# Patient Record
Sex: MALE | Race: WHITE
[De-identification: names, ages, dates, MRNs, and addresses within clinical notes are randomized per-mention and may not be internally consistent; named-entity substitution may affect disease eponyms.]

---

## 2019-09-24 ENCOUNTER — HOSPITAL ENCOUNTER (EMERGENCY)
Dept: HOSPITAL 44 - ED | Age: 44
Discharge: HOME | End: 2019-09-24
Payer: SELF-PAY

## 2019-09-24 VITALS — DIASTOLIC BLOOD PRESSURE: 91 MMHG | SYSTOLIC BLOOD PRESSURE: 132 MMHG

## 2019-09-24 DIAGNOSIS — L03.011: Primary | ICD-10-CM

## 2019-09-24 LAB
BASOPHILS NFR BLD: 0.7 % (ref 0–1.5)
EGFR (NON-AFRICAN): > 60
MCV RBC AUTO: 88 FL (ref 80–100)
NEUTROPHILS #: 13 # K/UL (ref 1.4–7.7)

## 2019-09-24 PROCEDURE — 99284 EMERGENCY DEPT VISIT MOD MDM: CPT

## 2019-09-24 PROCEDURE — 73130 X-RAY EXAM OF HAND: CPT

## 2019-09-24 PROCEDURE — 80053 COMPREHEN METABOLIC PANEL: CPT

## 2019-09-24 PROCEDURE — 85025 COMPLETE CBC W/AUTO DIFF WBC: CPT

## 2019-09-24 PROCEDURE — 84550 ASSAY OF BLOOD/URIC ACID: CPT

## 2019-09-24 PROCEDURE — 99282 EMERGENCY DEPT VISIT SF MDM: CPT

## 2019-09-24 NOTE — ED PHYSICIAN DOCUMENTATION
Upper Extremity Problem





- HISTORIAN


Historian: patient





- HPI


Chief Complaint: Upper Extremity Problem (Redness/Swelling Right Hand)


Additional Information: 


Patient c/o right hand discomfort, redness, swelling that started 3 days ago in 

his elbow but has radiated to the right hand.  Neurovasculars are intact; denies

any injury.


Location: R hand


Onset: days ago (Started 3 days ago)


Timing: still present


Duration: constant


Recent Injury: No


Where: home


Severity: mild


Associated Symptoms: denies: fever, chills, shortness of breath


Exacerbated By: nothing


Relieved By: nothing


Quality: pain (3/10), swelling, tenderness





- ROS


CONST: no problems


EYES/ENT: none


CVS/RESP: none


GI/: none


MS/SKIN/LYMPH: denies: swollen glands


NEURO/PSYCH: denies: headache





- PAST HX


Past History: none


Other History: none


Surgeries/Procedures: other (eye surgery as a kid)


Immunizations: UTD


Allergies/Adverse Reactions: 


                                    Allergies











Allergy/AdvReac Type Severity Reaction Status Date / Time


 


cat dander AdvReac  Sneezing Verified 09/24/19 12:14


 


lightening bugs AdvReac  Rash Uncoded 09/24/19 12:14














Home Medications: 


                                Ambulatory Orders











 Medication  Instructions  Recorded


 


Ibuprofen [Ibu] 800 mg PO Q8H PRN #30 tablet 09/24/19


 


Sulfamethoxazole/Trimethoprim 1 each PO BID #20 tab 09/24/19





[Bactrim Ds]  














- SOCIAL HX


Smoking History: greater than 1 pack/day


Drug Use: cocaine, marijuana





- FAMILY HX


Family History: none





- VITAL SIGNS


Vital Signs: 


                                   Vital Signs











Temp Pulse Resp BP Pulse Ox


 


 98.5 F   110 H  16   132/91   96 


 


 09/24/19 13:14  09/24/19 13:14  09/24/19 13:14  09/24/19 13:14  09/24/19 13:14














- REVIEWED ASSESSMENTS


Nursing Assessment  Reviewed: Yes


Vitals Reviewed: Yes





ED Results Lab/Radiology





- Lab Results


Lab Results: 


                                   Lab Results











  09/24/19 09/24/19 09/24/19





  12:33 12:33 12:33


 


WBC      15.90 K/ul H K/ul





     (4.00-12.00) 


 


RBC      5.20 M/ul M/ul





     (3.90-5.20) 


 


Hgb      15.1 g/dL g/dL





     (12.0-18.0) 


 


Hct      45.5 % %





     (37.0-53.0) 


 


MCV      88.0 fl fl





     (80.0-100.0) 


 


MCH      29.1 pg pg





     (28.0-34.0) 


 


MCHC      33.2 g/dL g/dL





     (30.0-36.0) 


 


RDW      12.3 % %





     (11.3-14.3) 


 


Plt Count      299 K/mm3 K/mm3





     (130-400) 


 


Neut % (Auto)      82.1 % H %





     (39.0-79.0) 


 


Lymph % (Auto)      9.3 % L %





     (16.0-50.0) 


 


Mono % (Auto)      6.7 % %





     (0.0-11.0) 


 


Eos % (Auto)      1.2 % %





     (0.0-6.8) 


 


Baso % (Auto)      0.7 % %





     (0.0-1.5) 


 


Neut # (Auto)      13.0 # k/uL H # k/uL





     (1.4-7.7) 


 


Lymph # (Auto)      1.5 # k/uL # k/uL





     (0.6-4.0) 


 


Mono # (Auto)      1.1 # k/uL H # k/uL





     (0.0-0.9) 


 


Eos # (Auto)      0.2 # k/uL # k/uL





     (0.0-0.6) 


 


Baso # (Auto)      0.1 # k/uL # k/uL





     (0.0-0.5) 


 


Sodium    140 mmol/L mmol/L  





    (137-145)  


 


Potassium    3.8 mmol/L mmol/L  





    (3.5-5.1)  


 


Chloride    100 mmol/L mmol/L  





    ()  


 


Carbon Dioxide    25 mmol/L mmol/L  





    (22-30)  


 


Anion Gap    18.8   





    


 


BUN    13 mg/dL mg/dL  





    (9-20)  


 


Creatinine    0.77 mg/dL mg/dL  





    (0.66-1.25)  


 


Estimated Creat Clear    206   





    


 


Est GFR ( Amer)    > 60   





   (60 - ) 


 


Est GFR (Non-Af Amer)    > 60   





   (60 - ) 


 


Glucose    159 mg/dL H mg/dL  





    ()  


 


Uric Acid  7.5 mg/dL mg/dL    





   (3.5-8.5)   


 


Calcium    9.6 mg/dL mg/dL  





    (8.4-10.2)  


 


Total Bilirubin    0.6 mg/dL mg/dL  





    (0.2-1.3)  


 


AST    32 U/L U/L  





    (15-46)  


 


ALT    16 U/L U/L  





    (13-69)  


 


Alkaline Phosphatase    91 U/L U/L  





    ()  


 


Total Protein    9.2 g/dL H g/dL  





    (6.3-8.2)  


 


Albumin    4.5 g/dL g/dL  





    (3.5-5.0)  














- Radiology


Radiology Impressions: 


Exam: Right hand.   


History: Pain.   


PA, lateral and oblique view of the right hand are submitted.  No signs of 

fracture or dislocation seen.  No bony erosions are seen.  No soft tissue 

abnormalities identified.   


Impression: 


No bony abnormality.





Electronically signed on Sep 24, 2019 12:50:11 PM CDT by:


Remy Wolff





- Orders


Orders: 


                                    ED Orders











 Category Date Time Status


 


 HAND XRAY [HAND 3 VIEWS OR MORE] [RAD] Stat Exams  09/24/19 Taken


 


 CBC/PLATELET/DIFF Routine Lab  09/24/19 12:33 Completed


 


 CMP Routine Lab  09/24/19 12:33 Completed


 


 URIC ACID Stat Lab  09/24/19 12:33 Completed














Upper Extremity Problem





- EXAM


General Appearance: no acute distress, alert


Skin: warm/dry, normal color, other (right thumb is reddened, erythema)


Shoulder Exam: normal inspection, non-tender, no evidence of injury, normal ROM


Elbow/Forearm Exam: normal inspection, non-tender, no evidence of injury, normal

 ROM


Wrist Exam: normal inspection, non-tender, no evidence of injury, normal ROM


Hand Exam: normal ROM, soft tissue tenderness


Neuro/Tendon: normal sensation, normal motor functions, normal tendon functions,

 responds to pain


EENT: eye inspection normal, ENT inspection normal


CVS: heart sounds normal


Vascular: no vascular compromise


Peripheral: sensation nml, motor nml


Central: oriented X3, CN's nml as tested, motor nml, sensation nml, mood/affect 

nml, cognition normal


Respiratory: breath sounds nml





Discharge


Clincal Impression: 


 Cellulitis of right thumb





Prescriptions: 


Ibuprofen [Ibu] 800 mg PO Q8H PRN #30 tablet


 PRN Reason: Pain


Sulfamethoxazole/Trimethoprim [Bactrim Ds] 1 each PO BID #20 tab


Referrals: 


Primary Doctor,No [Primary Care Provider] - 2 Days


Additional Instructions: 


Take Bactrim DS by mouth twice a day UNTIL GONE


Take Ibuprofen 800mg every 8 hours for 24 hours and then every 8 hours as needed

for pain/inflammation


May use ice packs


Follow up with PCP in 7-10 days for re-evaluation


Condition: Good


Disposition: 01 HOME, SELF-CARE


Decision to Admit: NO


Decision Time: 15:38

## 2019-09-24 NOTE — DIAGNOSTIC IMAGING REPORT
[facility] 

[facility]

[id number] [address] [address]

 

 

 

 

Report Submission Date: Sep 24, 2019 12:50:11 PM CDT

Patient       Study

Name:   TESSIE HESTER       Date:   Sep 24, 2019 12:19:56 PM CDT

MRN:   [id number]       Modality Type:   DX

Gender:   M       Description:   HAND 3 VIEWS OR MORE

:   77       Institution:   [facility]

Physician:   [facility]

     Accession:    [id number]

 

 

Exam: Right hand.  



History: Pain.  



PA, lateral and oblique view of the right hand are submitted.  No signs of 
fracture or dislocation seen.  No bony erosions are seen.  No soft tissue 
abnormalities identified.  



Impression:

No bony abnormality.

 

Electronically signed on Sep 24, 2019 12:50:11 PM CDT by:

Remy TRAYLOR